# Patient Record
Sex: FEMALE
[De-identification: names, ages, dates, MRNs, and addresses within clinical notes are randomized per-mention and may not be internally consistent; named-entity substitution may affect disease eponyms.]

---

## 2023-03-08 ENCOUNTER — NURSE TRIAGE (OUTPATIENT)
Dept: OTHER | Facility: CLINIC | Age: 33
End: 2023-03-08

## 2023-03-09 NOTE — TELEPHONE ENCOUNTER
Location of patient: [place]    Subjective: Caller states \"31 wks pregnant with covid\"     Current Symptoms:   Body aches  Sore throat  Fever   Mucus   Chest pressure   Certain times trouble breathing     Pain Severity:   Body aches 8/10  Chest pressure 9/10    Temperature:    100.0    What has been tried:   None     Recommended disposition:   Go to ED now     Care advice provided, patient verbalizes understanding; denies any other questions or concerns.     Outcome: Go to ED     This triage is a result of a call to the [address] [place] [address]    Reason for Disposition   SEVERE or constant chest pain or pressure  (Exception: Mild central chest pain, present only when coughing.)    Protocols used: Coronavirus (COVID-19) Diagnosed or Suspected-ADULT-